# Patient Record
Sex: MALE | Race: OTHER | Employment: STUDENT | URBAN - METROPOLITAN AREA
[De-identification: names, ages, dates, MRNs, and addresses within clinical notes are randomized per-mention and may not be internally consistent; named-entity substitution may affect disease eponyms.]

---
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
## 2024-11-29 ENCOUNTER — APPOINTMENT (OUTPATIENT)
Dept: GENERAL RADIOLOGY | Age: 19
End: 2024-11-29
Attending: STUDENT IN AN ORGANIZED HEALTH CARE EDUCATION/TRAINING PROGRAM
Payer: COMMERCIAL

## 2024-11-29 ENCOUNTER — HOSPITAL ENCOUNTER (OUTPATIENT)
Age: 19
Discharge: HOME | End: 2024-11-29
Attending: STUDENT IN AN ORGANIZED HEALTH CARE EDUCATION/TRAINING PROGRAM
Payer: COMMERCIAL

## 2024-11-29 VITALS
DIASTOLIC BLOOD PRESSURE: 59 MMHG | SYSTOLIC BLOOD PRESSURE: 117 MMHG | HEART RATE: 87 BPM | RESPIRATION RATE: 16 BRPM | TEMPERATURE: 99 F | OXYGEN SATURATION: 98 %

## 2024-11-29 DIAGNOSIS — S99.921A INJURY OF RIGHT FOOT, INITIAL ENCOUNTER: Primary | ICD-10-CM

## 2024-11-29 PROCEDURE — 99204 OFFICE O/P NEW MOD 45 MIN: CPT

## 2024-11-29 PROCEDURE — 99213 OFFICE O/P EST LOW 20 MIN: CPT

## 2024-11-29 PROCEDURE — 73630 X-RAY EXAM OF FOOT: CPT | Performed by: STUDENT IN AN ORGANIZED HEALTH CARE EDUCATION/TRAINING PROGRAM

## 2024-11-29 NOTE — ED PROVIDER NOTES
Patient Seen in: [facility]      History     Chief Complaint   Patient presents with    Leg or Foot Injury     Stated Complaint: foot injury    Subjective:   HPI      19-year-old male with no significant past medical history, who presents with his mother with concern for right lateral distal foot pain, following a mechanical fall earlier this morning.  He states he felt while walking on cement steps.  He denies any head trauma or any other injuries.  There is no loss of consciousness.  He notes superficial abrasions of the foot, but no bleeding.    Objective:     Past Medical History:    Accommodative esotropia    Amblyopia    Anisometropia    Hyperopia              Past Surgical History:   Procedure Laterality Date    Adenoidectomy      Removal of tonsils,12+ y/o      Tonsillectomy                  Social History     Socioeconomic History    Marital status: Single   Tobacco Use    Smoking status: Never    Smokeless tobacco: Never   Vaping Use    Vaping status: Never Used   Substance and Sexual Activity    Alcohol use: No    Drug use: No   Other Topics Concern    Caffeine Concern No    Second-hand smoke exposure Yes    Alcohol/drug concerns No    Violence concerns No   Social History Narrative    ** Merged History Encounter **                   Review of Systems    Positive for stated complaint: foot injury  Other systems are as noted in HPI.  Constitutional and vital signs reviewed.      All other systems reviewed and negative except as noted above.    Physical Exam     ED Triage Vitals [11/29/24 1126]   /59   Pulse 87   Resp 16   Temp 98.7 °F (37.1 °C)   Temp src Temporal   SpO2 98 %   O2 Device None (Room air)       Current Vitals:   No data recorded      Physical Exam    General: Awake, alert, comfortable on room air, in no distress, tolerating oral secretions, interactive  Pulmonary: No conversational dyspnea  Cardiac: +2 B/L regular PT and DP pulses, no B/L LE edema  Neuro: Symmetrical  facial expressions on gross observation  Musculoskeletal: 5/5 B/L plantarflexion and dorsiflexion, intact active right ankle plantarflexion and dorsiflexion with no exacerbation of symptoms, no TTP throughout the right ankle, soft compartments of the right lower extremity, with squeezing the right calf there is passive plantarflexion of the right ankle, no obvious deformity of the ankle or foot, TTP throughout the right lateral midfoot with no overlying erythema or warmth or fluctuance, no subungual hematomas, can wiggle all toes, no sign of injury to the plantar foot but also with mild TTP at the right distal lateral plantar foot  HEENT: No periorbital edema or erythema  Skin: Very superficial abrasion of the right lateral proximal dorsal foot with no surrounding erythema or purulence or fluctuance or bleeding no laceration, no deeper tissues involved  Psych: Normal mood, normal affect    ED Course   Of radiology port of patient's right foot x-ray:  ROCEDURE: XR FOOT, COMPLETE (MIN 3 VIEWS), RIGHT (CPT=73630)     COMPARISON: None available.     INDICATIONS: Right foot pain after sustaining a fall.     TECHNIQUE: 3 views were obtained without weightbearing technique.       FINDINGS:  BONES: No acute fracture or dislocation is evident. No suspicious osseous lesions are seen. The joint spaces are preserved without evidence of significant arthropathy.  SOFT TISSUES: Negative for visible soft tissue swelling or radiopaque foreign body.    EFFUSION: None visible.                   Impression  CONCLUSION:  No radiographically visible acute osseous injury of the right foot.           Dictated by (CST): Marvin Szymanski MD on 11/29/2024 at 11:57 AM      Finalized by (CST): Marvin Szymanski MD on 11/29/2024 at 11:58 AM              Exam Ended: 11/29/24 11:50 Last Resulted: 11/29/24 11:58     MDM   Patient is awake, alert, comfortable on room air, in no distress, no obvious deformity to the right foot or ankle, no TTP  throughout the right ankle, no sign of ankle injury, very superficial abrasion with no sign of deep tissue involvement or laceration of the right proximal lateral dorsal foot with no sign of secondary bacterial infection or cellulitis, soft compartments throughout and neurovascular intact with no signs of compartment syndrome, intact strength, mild TTP of the right lateral midfoot and distal plantar lateral foot concerning for possible fracture given reported trauma versus sprain versus strain  -Per my personal review and interpretation of the patient's x-ray there are no fractures, this is consistent with my review of the radiology report as copied above  -However, we did discuss there can be false negatives early in the clinical course, as well as x-ray imaging is limited to assessment of bones and cannot assess the tendons, ligaments, muscles, and other structures which also could be injured.  -Patient placed in a hard sole shoe to help reduce irritation with ambulation, discussed symptomatic management with rest, elevation when at rest, application of ice, and over-the-counter Tylenol or ibuprofen if needed for pain control long as he has no contraindications  -If there is no improvement with symptomatic management over the next 4 to 5 days, I did recommend follow-up with his primary care physician or with a foot specialist for reassessment and for further recommendations    Medical Decision Making  Amount and/or Complexity of Data Reviewed  Independent Historian: parent     Details: Patient's mother assists with history  Radiology: ordered and independent interpretation performed.    Risk  OTC drugs.        Disposition and Plan     Clinical Impression:  1. Injury of right foot, initial encounter         Disposition:  Discharge  11/29/2024 12:16 pm    Follow-up:  Phillip Hair DPM  [address] [place] [id number]  [phone]      podiatry    Maria C French MD  [address] [place] [address]  [place] [address]  [phone]    In 3 days  As needed, If symptoms worsen          Medications Prescribed:    None

## 2024-11-29 NOTE — ED INITIAL ASSESSMENT (HPI)
Patient arrives ambulatory with c/o right foot pain after mis-stepping on a cement step earlier today.

## 2024-11-29 NOTE — DISCHARGE INSTRUCTIONS
At this time there is no obvious broken bone on x-ray imaging.  However, x-ray imaging is limited to assessment of the bones and cannot assess the tendons, ligaments, muscles, and other structures also could be injured.  There also could be a false negative early in the clinical course.  Therefore, I recommend rest, elevation when at rest, application of ice, no heavy lifting, no straining, no running, no physical activity, until symptoms have resolved.    I have also given you a shoe that you can wear to help reduce further strain on the joint.    If symptoms have not resolved with symptomatic management over the next 4 to 5 days, I recommend follow-up with a podiatrist or with your primary care physician for reassessment and for further recommendations.

## (undated) DEVICE — EYE PADSSTERILENOT MADE WITH NATURAL RUBBER LATEXSINGLE USE ONLYDO NOT USE IF PACKAGE OPENED OR DAMAGED: Brand: CARDINAL HEALTH

## (undated) DEVICE — GOWN SURG AERO BLUE PERF XLG

## (undated) DEVICE — DRAPE SRG 70X60IN SPLT U IMPRV

## (undated) DEVICE — DISP ATKINSON RETROBULBAR NDL 25G 10/BOX: Brand: DISP ATKINSON RETROBULBAR NDL 25G 10/BOX

## (undated) DEVICE — Device

## (undated) DEVICE — SUTURE VICRYL 6-0 S-29

## (undated) DEVICE — OUTPATIENT: Brand: MEDLINE INDUSTRIES, INC.

## (undated) DEVICE — NDLCTR: FOAM/ADHESIVE 10CT 96/CS: Brand: MEDICAL ACTION INDUSTRIES

## (undated) DEVICE — SUCTION CANISTER, 3000CC,SAFELINER: Brand: DEROYAL

## (undated) DEVICE — 6 ML SYRINGE LUER-LOCK TIP: Brand: MONOJECT

## (undated) DEVICE — DRAPE SRG U 124X80IN U REINF

## (undated) DEVICE — STERILE POLYISOPRENE POWDER-FREE SURGICAL GLOVES: Brand: PROTEXIS

## (undated) DEVICE — WIPE WITH WICK AND CORNEAL SHIELD: Brand: MEROCEL

## (undated) DEVICE — SOLUTION IRR BTL 250CC NACL

## (undated) DEVICE — APPLICATOR COTTON TIP 3 10/PK

## (undated) DEVICE — EYE PAK* 1020 PLASTIC OPHTHALMIC DRAPE INCISE: Brand: ALCON EYE-PAK

## (undated) DEVICE — PREP BETADINE SOL 5% EYE

## (undated) NOTE — LETTER
VACCINE ADMINISTRATION RECORD  PARENT / GUARDIAN APPROVAL  Date: 3/19/2019  Vaccine administered to: Zoe Otto     : 10/21/2005    MRN: [id number]    A copy of the appropriate Centers for Disease Control and Prevention Vaccine Information statemen

## (undated) NOTE — LETTER
State [facility] [employer] of Child Health Examination       Student's Name  46 [facility] Birth Da Title                           Date     Signature HEALTH HISTORY          TO BE COMPLETED AND SIGNED BY PARENT/GUARDIAN AND VERIFIED BY HEALTH CARE PROVIDER    ALLERGIES  (Food, drug, insect, other) MEDICATION  (List all prescribed or taken on a regular basis.)     Diagnosis of asthma?   Child wakes during DIABETES SCREENING  BMI>85% age/sex  No And any two of the following:  Family History No   Ethnic Minority  No          Signs of Insulin Resistance (hypertension, dyslipidemia, polycystic ovarian syndrome, acanthosis nigricans)    No           At Risk  No Quick-relief  medication (e.g. Short Acting Beta Antagonist): No          Controller medication (e.g. inhaled corticosteroid):   No Other   NEEDS/MODIFICATIONS required in the school setting  None DIETARY Needs/Restrictions     None   SPECIAL INSTR

## (undated) NOTE — LETTER
[facility] [employer] of Child Health Examination       Student's Name  Kevin Jimenez D Title                           Date  03/19/2019   Signature HEALTH HISTORY          TO BE COMPLETED AND SIGNED BY PARENT/GUARDIAN AND VERIFIED BY HEALTH CARE PROVIDER    ALLERGIES  (Food, drug, insect, other)  Patient has no known allergies.  MEDICATION  (List all prescribed or taken on a regular basis.)  No current /82   Pulse 94   Ht 5' 8.75\" (1.746 m)   Wt 58.5 kg (129 lb)   BMI 19.19 kg/m²     DIABETES SCREENING  BMI>85% age/sex  No And any two of the following:  Family History yes     Ethnic Minority  No          Signs of Insulin Resistance (hypertension, Currently Prescribed Asthma Medication:            Quick-relief  medication (e.g. Short Acting Beta Antagonist): No          Controller medication (e.g. inhaled corticosteroid):   No Other   NEEDS/MODIFICATIONS required in the school setting  None DIET

## (undated) NOTE — MR AVS SNAPSHOT
[facility]  [address]  [phone]               Thank you for choosing us for your health care visit with Farooq Bolaños MD.  We are glad to serve you and happy to provide you with this [address]ter[address], [place]    It is the patient's responsibility to check with and follow their insurance company's guidelines for prior authorization for this test.  You may be held responsible for payment in full if proper authorization is not acquired.   Please contac Medical Issues Discussed Today     Chest wall asymmetry    -  Primary    Adolescent idiopathic scoliosis of thoracic region          Instructions and Information about Your Health    Diagnoses and all orders for this visit:    Chest wall asymmetry  -     X Healthy nutrition starts as early as infancy with breastfeeding. Once your baby begins eating solid foods, introduce nutritious foods early on and often. Sometimes toddlers need to try a food 10 times before they actually accept and enjoy it.  It is also im

## (undated) NOTE — ED AVS SNAPSHOT
[facility] in [address] [address]    Phone:  [phone]    Fax:  [phone]           Georgie Barlowmitz   MRN: [id number]    Department:  [facility] in [address] [place] [address]   Date of Visit:  5 and physician's office to determine coverage and benefits available for follow-up care and referrals. It is our goal to assure that you are completely satisfied with every aspect of your visit today.   In an effort to constantly improve our service to y Any imaging studies and labs completed today can be reviewed in your MyChart account. You may have had testing done that requires us to contact you. Please make sure we have your correct phone number on file.      OUR CURRENT HOURS OF OPERATION:  MONDAY T and ask to get set up for an insurance coverage that is in-network with [place]. [employer]     Sign up for [employer] access for your child.   [employer] access allows you to view health information for your child from their recent   visit, vi

## (undated) NOTE — LETTER
State [facility] [employer] of Child Health Examination       Student's Name  Graham Serra Birth D Signature                                                                                                                                              Title                           Date    (If adding dates to the above immunization history section, put y Patient has no known allergies. MEDICATION  (List all prescribed or taken on a regular basis.)  No current outpatient medications on file. Diagnosis of asthma?   Child wakes during the night coughing   Yes   No    Yes   No    Loss of function of one of pa DIABETES SCREENING  BMI>85% age/sex  No And any two of the following:  Family History No    Ethnic Minority  No          Signs of Insulin Resistance (hypertension, dyslipidemia, polycystic ovarian syndrome, acanthosis nigricans)    No           At Risk  No Quick-relief  medication (e.g. Short Acting Beta Antagonist): No          Controller medication (e.g. inhaled corticosteroid):   No Other   NEEDS/MODIFICATIONS required in the school setting  None DIETARY Needs/Restrictions     None   SPECIAL INSTR

## (undated) NOTE — LETTER
VACCINE ADMINISTRATION RECORD  PARENT / GUARDIAN APPROVAL  Date: 2022  Vaccine administered to: Alpa Guzman     : 10/21/2005    MRN: [id number]    A copy of the appropriate Centers for Disease Control and Prevention Vaccine Information statement has been provided. I have read or have had explained the information about the diseases and the vaccines listed below. There was an opportunity to ask questions and any questions were answered satisfactorily. I believe that I understand the benefits and risks of the vaccine cited and ask that the vaccine(s) listed below be given to me or to the person named above (for whom I am authorized to make this request). VACCINES ADMINISTERED:  Menveo    I have read and hereby agree to be bound by the terms of this agreement as stated above. My signature is valid until revoked by me in writing. This document is signed by , relationship: Parents on 2022.:                                                                                                   2022                         Parent / Farooqa [place]                                                Date    Torito Reyes served as a witness to authentication that the identity of the person signing electronically is in fact the person represented as signing. This document was generated by Torito Reyes on 2022.

## (undated) NOTE — ED AVS SNAPSHOT
Onesimo Katheryn   MRN: [id number]    Department:  [facility]   Date of Visit:  2/1/2020           Disclosure     Insurance plans vary and the physician(s) referred by the ER may not be covered by your plan.  Please contact y CARE PHYSICIAN AT ONCE OR RETURN IMMEDIATELY TO THE EMERGENCY DEPARTMENT. If you have been prescribed any medication(s), please fill your prescription right away and begin taking the medication(s) as directed.   If you believe that any of the medications

## (undated) NOTE — LETTER
VACCINE ADMINISTRATION RECORD  PARENT / GUARDIAN APPROVAL  Date: 2018  Vaccine administered to: Blank Garcia     : 10/21/2005    MRN: [id number]    A copy of the appropriate Centers for Disease Control and Prevention Vaccine Information statement